# Patient Record
Sex: FEMALE | Race: WHITE | NOT HISPANIC OR LATINO | Employment: OTHER | URBAN - METROPOLITAN AREA
[De-identification: names, ages, dates, MRNs, and addresses within clinical notes are randomized per-mention and may not be internally consistent; named-entity substitution may affect disease eponyms.]

---

## 2017-01-01 ENCOUNTER — TELEPHONE (OUTPATIENT)
Dept: FAMILY MEDICINE CLINIC | Facility: CLINIC | Age: 82
End: 2017-01-01

## 2017-01-01 ENCOUNTER — OFFICE VISIT (OUTPATIENT)
Dept: FAMILY MEDICINE CLINIC | Facility: CLINIC | Age: 82
End: 2017-01-01

## 2017-01-01 VITALS
OXYGEN SATURATION: 100 % | SYSTOLIC BLOOD PRESSURE: 128 MMHG | HEIGHT: 65 IN | DIASTOLIC BLOOD PRESSURE: 72 MMHG | BODY MASS INDEX: 22.49 KG/M2 | WEIGHT: 135 LBS | HEART RATE: 97 BPM | TEMPERATURE: 98 F

## 2017-01-01 DIAGNOSIS — L03.032 PARONYCHIA, TOE, LEFT: Primary | ICD-10-CM

## 2017-01-01 PROCEDURE — 99213 OFFICE O/P EST LOW 20 MIN: CPT | Performed by: NURSE PRACTITIONER

## 2017-01-01 PROCEDURE — 90662 IIV NO PRSV INCREASED AG IM: CPT | Performed by: NURSE PRACTITIONER

## 2017-01-01 PROCEDURE — G0008 ADMIN INFLUENZA VIRUS VAC: HCPCS | Performed by: NURSE PRACTITIONER

## 2017-01-01 RX ORDER — BACITRACIN, NEOMYCIN, POLYMYXIN B 400; 3.5; 5 [USP'U]/G; MG/G; [USP'U]/G
OINTMENT TOPICAL 2 TIMES DAILY
Qty: 15 G | Refills: 0 | Status: SHIPPED | OUTPATIENT
Start: 2017-01-01

## 2017-01-01 RX ORDER — MUPIROCIN CALCIUM 20 MG/G
CREAM TOPICAL 2 TIMES DAILY
Qty: 15 G | Refills: 0 | Status: SHIPPED | OUTPATIENT
Start: 2017-01-01

## 2017-01-01 RX ORDER — CIPROFLOXACIN 500 MG/1
500 TABLET, FILM COATED ORAL 2 TIMES DAILY
Qty: 14 TABLET | Refills: 0 | Status: SHIPPED | OUTPATIENT
Start: 2017-01-01 | End: 2018-01-01 | Stop reason: SDUPTHER

## 2017-01-01 RX ORDER — SPIRONOLACTONE 25 MG/1
12.5 TABLET ORAL
COMMUNITY
Start: 2017-02-13 | End: 2018-01-01

## 2017-01-01 RX ORDER — PROPYLTHIOURACIL 50 MG/1
TABLET ORAL
Qty: 126 TABLET | Refills: 0 | Status: SHIPPED | OUTPATIENT
Start: 2017-01-01 | End: 2017-01-01 | Stop reason: SDUPTHER

## 2017-01-01 RX ORDER — PROPYLTHIOURACIL 50 MG/1
TABLET ORAL
Qty: 126 TABLET | Refills: 0 | Status: SHIPPED | OUTPATIENT
Start: 2017-01-01 | End: 2018-01-01

## 2017-01-01 RX ORDER — DOXYCYCLINE HYCLATE 100 MG/1
100 CAPSULE ORAL 2 TIMES DAILY
Qty: 20 CAPSULE | Refills: 0 | Status: SHIPPED | OUTPATIENT
Start: 2017-01-01 | End: 2017-01-01

## 2017-01-01 RX ORDER — LISINOPRIL 5 MG/1
2.5 TABLET ORAL
COMMUNITY

## 2017-01-17 RX ORDER — PROPYLTHIOURACIL 50 MG/1
TABLET ORAL
Qty: 126 TABLET | Refills: 0 | Status: SHIPPED | OUTPATIENT
Start: 2017-01-17 | End: 2017-02-03 | Stop reason: SDUPTHER

## 2017-02-02 ENCOUNTER — OFFICE VISIT (OUTPATIENT)
Dept: FAMILY MEDICINE CLINIC | Facility: CLINIC | Age: 82
End: 2017-02-02

## 2017-02-02 VITALS
HEART RATE: 88 BPM | WEIGHT: 124 LBS | BODY MASS INDEX: 19.46 KG/M2 | TEMPERATURE: 98.5 F | RESPIRATION RATE: 20 BRPM | OXYGEN SATURATION: 100 % | DIASTOLIC BLOOD PRESSURE: 60 MMHG | HEIGHT: 67 IN | SYSTOLIC BLOOD PRESSURE: 106 MMHG

## 2017-02-02 DIAGNOSIS — Z86.73 HISTORY OF STROKE: ICD-10-CM

## 2017-02-02 DIAGNOSIS — Z86.79 HISTORY OF CONGESTIVE HEART FAILURE: ICD-10-CM

## 2017-02-02 DIAGNOSIS — N39.0 URINARY TRACT INFECTION, SITE UNSPECIFIED: ICD-10-CM

## 2017-02-02 DIAGNOSIS — I25.2 HISTORY OF MYOCARDIAL INFARCTION: ICD-10-CM

## 2017-02-02 DIAGNOSIS — L89.92 PRESSURE ULCER, STAGE II (HCC): ICD-10-CM

## 2017-02-02 DIAGNOSIS — Z86.79 HISTORY OF ATRIAL FIBRILLATION: Primary | ICD-10-CM

## 2017-02-02 DIAGNOSIS — E86.0 DEHYDRATION: ICD-10-CM

## 2017-02-02 DIAGNOSIS — I50.9 CHRONIC CONGESTIVE HEART FAILURE, UNSPECIFIED CONGESTIVE HEART FAILURE TYPE: Primary | ICD-10-CM

## 2017-02-02 LAB
ANION GAP SERPL CALCULATED.3IONS-SCNC: 11.3 MMOL/L
BUN BLD-MCNC: 18 MG/DL (ref 8–23)
BUN/CREAT SERPL: 15.9 (ref 7–25)
CALCIUM SPEC-SCNC: 8.7 MG/DL (ref 8.6–10.5)
CHLORIDE SERPL-SCNC: 103 MMOL/L (ref 98–107)
CO2 SERPL-SCNC: 26.7 MMOL/L (ref 22–29)
CREAT BLD-MCNC: 1.13 MG/DL (ref 0.57–1)
GFR SERPL CREATININE-BSD FRML MDRD: 46 ML/MIN/1.73
GLUCOSE BLD-MCNC: 104 MG/DL (ref 65–99)
POTASSIUM BLD-SCNC: 4.4 MMOL/L (ref 3.5–5.2)
SODIUM BLD-SCNC: 141 MMOL/L (ref 136–145)

## 2017-02-02 PROCEDURE — 80048 BASIC METABOLIC PNL TOTAL CA: CPT | Performed by: FAMILY MEDICINE

## 2017-02-02 PROCEDURE — 99214 OFFICE O/P EST MOD 30 MIN: CPT | Performed by: FAMILY MEDICINE

## 2017-02-02 RX ORDER — SACCHAROMYCES BOULARDII 250 MG
250 CAPSULE ORAL 2 TIMES DAILY
COMMUNITY

## 2017-02-02 RX ORDER — SPIRONOLACTONE 25 MG/1
12.5 TABLET ORAL DAILY
COMMUNITY
Start: 2017-01-21 | End: 2017-02-20

## 2017-02-02 RX ORDER — CEPHALEXIN 500 MG/1
500 CAPSULE ORAL 2 TIMES DAILY
COMMUNITY
End: 2017-01-01

## 2017-02-02 NOTE — PROGRESS NOTES
SUBJECTIVE:  The patient is an 81-year-old white female is here for follow-up.  She was hospitalized from January 18 to January 21.  Her diagnosis was atrial fibrillation congestive heart failure dehydration and coccyx ulcer and hypertension.  He feels much better since her discharge.  She's taking fluids and eating.  Today she has no specific physical complaint.     PAST MEDICAL HISTORY:  Reviewed.    REVIEW OF SYSTEMS:  Please see above; 14 point ROS otherwise negative.      OBJECTIVE: Vitals signs are reviewed and are stable.  Oriented ×3   HEENT: PERRLA.    Neck:  Supple.    Lungs:  Clear.    Heart:  Irregularly irregular   Abdomen:   Soft, nontender.    Extremities:  No cyanosis, clubbing or edema.    Examination of the coccyx area reveals redness but no obvious ulceration currently.  Urinalysis    ASSESSMENT:     CHF  Pressure ulcer coccyx stage II  UTI  Duration  Congestive heart failure-compensated  Hypertension    PLAN:   The patient was unable to urinate she will return with the urine.  MP is ordered.  Call on results.  Notify me if any problems.    Much of this encounter note is an electronic transcription/translation of spoken language to printed text.  The electronic translation of spoken language may permit erroneous, or at times, nonsensical words or phrases to be inadvertently transcribed.  Although I have reviewed the note for such errors, some may still exist.

## 2017-02-03 RX ORDER — PROPYLTHIOURACIL 50 MG/1
TABLET ORAL
Qty: 126 TABLET | Refills: 0 | Status: SHIPPED | OUTPATIENT
Start: 2017-02-03 | End: 2017-01-01 | Stop reason: SDUPTHER

## 2017-02-07 LAB
BACTERIA UR QL AUTO: ABNORMAL /HPF
BILIRUB UR QL STRIP: NEGATIVE
CLARITY UR: CLEAR
COLOR UR: YELLOW
GLUCOSE UR STRIP-MCNC: NEGATIVE MG/DL
HGB UR QL STRIP.AUTO: NEGATIVE
KETONES UR QL STRIP: ABNORMAL
LEUKOCYTE ESTERASE UR QL STRIP.AUTO: NEGATIVE
NITRITE UR QL STRIP: NEGATIVE
PH UR STRIP.AUTO: 5.5 [PH] (ref 4.6–8)
PROT UR QL STRIP: NEGATIVE
RBC # UR: ABNORMAL /HPF
REF LAB TEST METHOD: ABNORMAL
SP GR UR STRIP: 1.02 (ref 1–1.03)
SQUAMOUS #/AREA URNS HPF: ABNORMAL /HPF
UROBILINOGEN UR QL STRIP: ABNORMAL
WBC UR QL AUTO: ABNORMAL /HPF

## 2017-02-07 PROCEDURE — 81001 URINALYSIS AUTO W/SCOPE: CPT | Performed by: FAMILY MEDICINE

## 2017-09-13 NOTE — TELEPHONE ENCOUNTER
Can try neosporin if ointment too expensive. Was the doxycycline pill too expensive too? Has she taken keflex before?

## 2017-09-13 NOTE — TELEPHONE ENCOUNTER
Pt doesn't have prescription insurance and the cream and antibiotic called in today was too expensive. Please advise.    Can you call in one of the free antibiotics to [facility] and its ok to send another ointment to the [facility] too.

## 2017-09-13 NOTE — PROGRESS NOTES
Subjective   Ada Snell is a 82 y.o. female.     History of Present Illness   C/o left great toe pain, started last week, she lost toe nail over the weekend, she states her toe is swollen, infected, red, she denies injury, tripping, she denies being outside, she tried soaking in epsom slt and hydrogen peroxide, has been covering with band aid, her daughter is in the room with her today, she denies fever, she has noticed some drainage, she uses wheelchair most of the time. She is on coumadin, has [facility] that checks and calls cardiology to manage. Denies recent abx. She states home health comes tomorrow, on for afib. Denies any hx of DM.   The following portions of the patient's history were reviewed and updated as appropriate: allergies, current medications, past family history, past medical history, past social history, past surgical history and problem list.    Review of Systems   Constitutional: Negative for chills, diaphoresis and fever.   Respiratory: Negative for shortness of breath.    Cardiovascular: Negative for chest pain.   Musculoskeletal: Positive for gait problem. Negative for arthralgias and myalgias.   Skin: Positive for color change and wound. Negative for rash.   Neurological: Negative for dizziness, light-headedness and headaches.   All other systems reviewed and are negative.      Objective   Physical Exam   Constitutional: She is oriented to person, place, and time. She appears well-developed and well-nourished.   HENT:   Head: Normocephalic.   Eyes: Pupils are equal, round, and reactive to light.   Neck: Neck supple.   Cardiovascular: Normal rate, regular rhythm, normal heart sounds and intact distal pulses.    Pulmonary/Chest: Effort normal and breath sounds normal.   Musculoskeletal: Normal range of motion.       Vascular Status -  Her exam exhibits right foot vasculature abnormal and no right foot edema. Her exam exhibits left foot vasculature abnormal and no left foot edema.    Skin Integrity  -  Her right foot skin is not intact.    Ada 's left foot skin is intact. .     Neurological: She is alert and oriented to person, place, and time.   Skin: Skin is warm and dry. Rash noted. Rash is pustular. Rash is not urticarial. There is erythema.   Psychiatric: She has a normal mood and affect. Her behavior is normal. Judgment and thought content normal.   Nursing note and vitals reviewed.      Assessment/Plan   Ada was seen today for nail problem.    Diagnoses and all orders for this visit:    Paronychia, toe, left  -     Cancel: Ambulatory Referral to Podiatry  -     Ambulatory Referral to Podiatry    Other orders  -     doxycycline (VIBRAMYCIN) 100 MG capsule; Take 1 capsule by mouth 2 (Two) Times a Day for 10 days.  -     mupirocin (BACTROBAN) 2 % cream; Apply  topically 2 (Two) Times a Day.  -     Flu Vaccine High Dose PF 65YR+      Start doxycyline 100mg 2x day for 10 days.  Apply bactroban to left toe base 2x day.   Keep wound clean and dry, may cover with clean band aid loosely.   Refer to podiatry, will call with appt.   Flu vaccine today.   Advised to inform home health and cardiologist about abx and on coumadin, home health visit tomorrow for INR check.  If any worsening symptoms, drainage, fever, call office or go to the ER.   Increase fluid intake, get plenty of rest.   Patient agrees with plan of care and understands instructions. Call if worsening symptoms or any problems or concerns.

## 2017-09-13 NOTE — TELEPHONE ENCOUNTER
Patient's daughter called, manuel, informed cipro called into [facility], informed [facility] nurse and cardiologist she is on abx and to check PT/INR check week, called in bactroban ointment, if too expensive she will try neosporin OTC until she sees podiatry.

## 2017-09-13 NOTE — PATIENT INSTRUCTIONS
Start doxycyline 100mg 2x day for 10 days.  Apply bactroban to left toe base 2x day.   Keep wound clean and dry, may cover with clean band aid loosely.   Refer to podiatry, will call with appt.   Flu vaccine today.   Advised to inform home health and cardiologist about abx and on coumadin, home health visit tomorrow for INR check.  If any worsening symptoms, drainage, fever, call office or go to the ER.   Increase fluid intake, get plenty of rest.   Patient agrees with plan of care and understands instructions. Call if worsening symptoms or any problems or concerns.

## 2017-09-13 NOTE — TELEPHONE ENCOUNTER
Pt's daughter informed and she stated the Doxycycline was expensive too. She hasn't taken Keflex before and as long as it doesn't have Penicillin in it,she should do ok on it.

## 2018-01-01 ENCOUNTER — TELEPHONE (OUTPATIENT)
Dept: FAMILY MEDICINE CLINIC | Facility: CLINIC | Age: 83
End: 2018-01-01

## 2018-01-01 ENCOUNTER — OFFICE VISIT (OUTPATIENT)
Dept: FAMILY MEDICINE CLINIC | Facility: CLINIC | Age: 83
End: 2018-01-01

## 2018-01-01 VITALS
TEMPERATURE: 97.6 F | DIASTOLIC BLOOD PRESSURE: 76 MMHG | SYSTOLIC BLOOD PRESSURE: 112 MMHG | HEIGHT: 66 IN | OXYGEN SATURATION: 96 % | WEIGHT: 135 LBS | HEART RATE: 114 BPM | BODY MASS INDEX: 21.69 KG/M2

## 2018-01-01 DIAGNOSIS — Z00.00 MEDICARE ANNUAL WELLNESS VISIT, INITIAL: Primary | ICD-10-CM

## 2018-01-01 DIAGNOSIS — R60.0 BILATERAL LEG EDEMA: ICD-10-CM

## 2018-01-01 DIAGNOSIS — L03.032 CELLULITIS OF TOE OF LEFT FOOT: ICD-10-CM

## 2018-01-01 DIAGNOSIS — E05.90 HYPERTHYROIDISM: ICD-10-CM

## 2018-01-01 DIAGNOSIS — N30.01 ACUTE CYSTITIS WITH HEMATURIA: ICD-10-CM

## 2018-01-01 DIAGNOSIS — L30.9 DERMATITIS: ICD-10-CM

## 2018-01-01 DIAGNOSIS — I10 ESSENTIAL HYPERTENSION: ICD-10-CM

## 2018-01-01 DIAGNOSIS — I48.21 PERMANENT ATRIAL FIBRILLATION (HCC): ICD-10-CM

## 2018-01-01 LAB
BACTERIA UR QL AUTO: ABNORMAL /HPF
BILIRUB UR QL STRIP: NEGATIVE
CLARITY UR: ABNORMAL
COLOR UR: YELLOW
GLUCOSE UR STRIP-MCNC: NEGATIVE MG/DL
HGB UR QL STRIP.AUTO: ABNORMAL
KETONES UR QL STRIP: NEGATIVE
LEUKOCYTE ESTERASE UR QL STRIP.AUTO: ABNORMAL
NITRITE UR QL STRIP: NEGATIVE
PH UR STRIP.AUTO: 5.5 [PH] (ref 4.6–8)
PROT UR QL STRIP: NEGATIVE
RBC # UR: ABNORMAL /HPF
REF LAB TEST METHOD: ABNORMAL
SP GR UR STRIP: 1.01 (ref 1–1.03)
SQUAMOUS #/AREA URNS HPF: ABNORMAL /HPF
UROBILINOGEN UR QL STRIP: ABNORMAL
WBC UR QL AUTO: ABNORMAL /HPF

## 2018-01-01 PROCEDURE — 96160 PT-FOCUSED HLTH RISK ASSMT: CPT | Performed by: NURSE PRACTITIONER

## 2018-01-01 PROCEDURE — G0438 PPPS, INITIAL VISIT: HCPCS | Performed by: NURSE PRACTITIONER

## 2018-01-01 PROCEDURE — 81001 URINALYSIS AUTO W/SCOPE: CPT | Performed by: NURSE PRACTITIONER

## 2018-01-01 PROCEDURE — 99214 OFFICE O/P EST MOD 30 MIN: CPT | Performed by: NURSE PRACTITIONER

## 2018-01-01 RX ORDER — CIPROFLOXACIN 500 MG/1
500 TABLET, FILM COATED ORAL EVERY 12 HOURS SCHEDULED
Qty: 14 TABLET | Refills: 0 | Status: SHIPPED | OUTPATIENT
Start: 2018-01-01

## 2018-01-01 RX ORDER — CLOTRIMAZOLE AND BETAMETHASONE DIPROPIONATE 10; .64 MG/G; MG/G
CREAM TOPICAL EVERY 12 HOURS SCHEDULED
Qty: 45 G | Refills: 1 | Status: SHIPPED | OUTPATIENT
Start: 2018-01-01

## 2018-01-23 PROBLEM — I47.29 PAROXYSMAL VENTRICULAR TACHYCARDIA (HCC): Status: ACTIVE | Noted: 2018-01-01

## 2018-01-23 PROBLEM — I48.21 PERMANENT ATRIAL FIBRILLATION (HCC): Status: ACTIVE | Noted: 2018-01-01

## 2018-01-23 PROBLEM — Z86.711 HISTORY OF PULMONARY EMBOLISM: Status: ACTIVE | Noted: 2018-01-01

## 2018-01-23 PROBLEM — Z79.01 CHRONIC ANTICOAGULATION: Status: ACTIVE | Noted: 2017-01-20

## 2018-01-23 PROBLEM — I35.1 AORTIC REGURGITATION: Status: ACTIVE | Noted: 2018-01-01

## 2018-01-23 PROBLEM — I51.7 LEFT VENTRICULAR HYPERTROPHY: Status: ACTIVE | Noted: 2018-01-01

## 2018-01-23 PROBLEM — I27.20 PULMONARY HYPERTENSION (HCC): Status: ACTIVE | Noted: 2018-01-01

## 2018-01-23 PROBLEM — E78.5 HYPERLIPIDEMIA LDL GOAL <70: Status: ACTIVE | Noted: 2018-01-01

## 2018-01-23 PROBLEM — N18.9 CHRONIC KIDNEY DISEASE: Status: ACTIVE | Noted: 2018-01-01

## 2018-01-23 PROBLEM — E05.90 HYPERTHYROIDISM: Status: ACTIVE | Noted: 2018-01-01

## 2018-01-23 PROBLEM — Z86.73 HISTORY OF STROKE: Status: ACTIVE | Noted: 2018-01-01

## 2018-01-23 PROBLEM — Z86.718 HISTORY OF DVT OF LOWER EXTREMITY: Status: ACTIVE | Noted: 2018-01-01

## 2018-01-23 PROBLEM — L89.152 PRESSURE ULCER OF COCCYGEAL REGION, STAGE 2 (HCC): Status: ACTIVE | Noted: 2017-01-20

## 2018-01-23 NOTE — PATIENT INSTRUCTIONS
medicare wellness today, UA today shows UTI start cipro 500 BID x 1 wk, Wipe front to back, increase H20 8 glasses day, enc freq toileting, trial lotrisone cream and avoid freq or prolonged exposure to urine and feces to prevent skin breakdown, cont FU with coumadin clinic and cardiology regarding mgmt and persistent B LE edema, suspect d/t untx hyperthyroid, enc elevating over level of heart to help with swelling and toe skin breakdown, apply bactroban ointment TID and monitor  Advance Directive  Advance directives are the legal documents that allow you to make choices about your health care and medical treatment if you cannot speak for yourself. Advance directives are a way for you to communicate your wishes to family, friends, and health care providers. The specified people can then convey your decisions about end-of-life care to avoid confusion if you should become unable to communicate.  Ideally, the process of discussing and writing advance directives should happen over time rather than making decisions all at once. Advance directives can be modified as your situation changes, and you can change your mind at any time, even after you have signed the advance directives. Each state has its own laws regarding advance directives.  You may want to check with your health care provider, , or state representative about the law in your state. Below are some examples of advance directives.  HEALTH CARE PROXY AND DURABLE POWER OF  FOR HEALTH CARE  A health care proxy is a person (agent) appointed to make medical decisions for you if you cannot. Generally, people choose someone they know well and trust to represent their preferences when they can no longer do so. You should be sure to ask this person for agreement to act as your agent. An agent may have to exercise judgment in the event of a medical decision for which your wishes are not known.  A durable power of  for health care is a legal  document that names your health care proxy. Depending on the laws in your state, after the document is written, it may also need to be:  · Signed.  · Notarized.  · Dated.  · Copied.  · Witnessed.  · Incorporated into your medical record.  You may also want to appoint someone to manage your financial affairs if you cannot. This is called a durable power of  for finances. It is a separate legal document from the durable power of  for health care. You may choose the same person or someone different from your health care proxy to act as your agent in financial matters.  LIVING WILL  A living will is a set of instructions documenting your wishes about medical care when you cannot care for yourself. It is used if you become:  · Terminally ill.  · Incapacitated.  · Unable to communicate.  · Unable to make decisions.  Items to consider in your living will include:  · The use or non-use of life-sustaining equipment, such as dialysis machines and breathing machines (ventilators).  · A do not resuscitate (DNR) order, which is the instruction not to use cardiopulmonary resuscitation (CPR) if breathing or heartbeat stops.  · Tube feeding.  · Withholding of food and fluids.  · Comfort (palliative) care when the goal becomes comfort rather than a cure.  · Organ and tissue donation.  A living will does not give instructions about distribution of your money and property if you should pass away. It is advisable to seek the expert advice of a  in drawing up a will regarding your possessions. Decisions about taxes, beneficiaries, and asset distribution will be legally binding. This process can relieve your family and friends of any burdens surrounding disputes or questions that may come up about the allocation of your assets.  DO NOT RESUSCITATE (DNR)  A do not resuscitate (DNR) order is a request to not have CPR in the event that your heart stops beating or you stop breathing. Unless given other instructions, a  health care provider will try to help any patient whose heart has stopped or who has stopped breathing.   This information is not intended to replace advice given to you by your health care provider. Make sure you discuss any questions you have with your health care provider.  Document Released: 2009 Document Revised: 04/10/2017 Document Reviewed: 2014  [employer] Interactive Patient Education © 2017 [employer]    Medicare Wellness  Personal Prevention Plan of Service     Date of Office Visit:  2018  Encounter Provider:  ABIGAIL Peraza  Place of Service:  [facility]  Patient Name: Ada Snell  :  1935    As part of the Medicare Wellness portion of your visit today, we are providing you with this personalized preventive plan of services (PPPS). This plan is based upon recommendations of the [place] Preventive Services Task Force (USPSTF) and the Advisory Committee on Immunization Practices (ACIP).    This lists the preventive care services that should be considered, and provides dates of when you are due. Items listed as completed are up-to-date and do not require any further intervention.    Health Maintenance   Topic Date Due   • TDAP/TD VACCINES (1 - Tdap) 1954   • MEDICARE ANNUAL WELLNESS  03/15/2016   • ZOSTER VACCINE  03/15/2016   • LIPID PANEL  2018   • INFLUENZA VACCINE  Completed   • PNEUMOCOCCAL VACCINES (65+ LOW/MEDIUM RISK)  Addressed       Orders Placed This Encounter   Procedures   • Urinalysis - Urine, Clean Catch   • Urinalysis, Microscopic Only - Urine, Clean Catch   • Urinalysis With Microscopic - Urine, Clean Catch       No Follow-up on file.

## 2018-01-23 NOTE — PROGRESS NOTES
Subjective   Ada Snell is a 82 y.o. female.     History of Present Illness   Here with daughter who c/o some increasing confusion and urin freq more recently last few days, with hx of chronic UTIs as hard for patient to get to bathroom d/t B LE edema, with freq urin and diarrhea, no fevers, no abd pain or flank pain, last tx UTI 09/17 cipro 500 BID and tolerated well, allergic codeine, PCN and sulfa, daughter also states has skin redness and breakdown s/t adult diapers  C/o foot swelling worsening, sees cardiology Krzysztof Johnston and on lasix 40 mg, with hyperthyroid used to take PTU but stopped and doesn't want sgy for goiter, no CP dizziness HA, on coreg, lisinopril, potassium, warfarin, now with B toe skin breakdown and tenderness, has seen wound before but just starting, has bactroban at home, not elevating feet at home    The following portions of the patient's history were reviewed and updated as appropriate: allergies, current medications, past family history, past medical history, past social history, past surgical history and problem list.    Review of Systems   Constitutional: Negative for fever.   Respiratory: Negative for cough, shortness of breath and wheezing.    Cardiovascular: Positive for leg swelling. Negative for chest pain and palpitations.   Gastrointestinal: Positive for diarrhea. Negative for abdominal distention, abdominal pain, anal bleeding, blood in stool, constipation, nausea, rectal pain and vomiting.   Endocrine: Negative for cold intolerance, heat intolerance, polydipsia, polyphagia and polyuria.   Genitourinary: Positive for frequency and urgency. Negative for decreased urine volume, difficulty urinating, dyspareunia, dysuria, enuresis, flank pain, genital sores, hematuria, menstrual problem, pelvic pain, vaginal bleeding, vaginal discharge and vaginal pain.   Musculoskeletal: Positive for arthralgias, back pain, gait problem, joint swelling and myalgias. Negative for neck pain and  neck stiffness.   Neurological: Negative for dizziness and headaches.   Psychiatric/Behavioral: Positive for confusion and dysphoric mood. Negative for agitation, behavioral problems, decreased concentration, hallucinations, self-injury, sleep disturbance and suicidal ideas. The patient is nervous/anxious. The patient is not hyperactive.    All other systems reviewed and are negative.      Objective   Physical Exam   Constitutional: She is oriented to person, place, and time. She appears well-developed and well-nourished.   HENT:   Head: Normocephalic and atraumatic.   Eyes: Conjunctivae and EOM are normal. Pupils are equal, round, and reactive to light.   Cardiovascular: Normal rate, regular rhythm and normal heart sounds.    Pulmonary/Chest: Effort normal and breath sounds normal.   Abdominal: Soft. She exhibits no distension and no mass. There is no tenderness. There is no rebound and no guarding. No hernia.   Musculoskeletal: Normal range of motion. She exhibits edema (B LE pitting) and tenderness (B LE).   Using wheelchair   Neurological: She is alert and oriented to person, place, and time.   Skin: Skin is warm and dry.   Erythematous wounds B toes no dc or bleeding   Psychiatric: She has a normal mood and affect. Her behavior is normal. Judgment and thought content normal.   Vitals reviewed.      Assessment/Plan   Ada was seen today for urinary tract infection and foot swelling.    Diagnoses and all orders for this visit:    Medicare annual wellness visit, initial    Acute cystitis with hematuria  -     Urinalysis With Microscopic - Urine, Clean Catch  -     Urinalysis - Urine, Clean Catch  -     Urinalysis, Microscopic Only - Urine, Clean Catch    Dermatitis    Hyperthyroidism    Permanent atrial fibrillation    Essential hypertension    Bilateral leg edema    Cellulitis of toe of left foot    Other orders  -     clotrimazole-betamethasone (LOTRISONE) 1-0.05 % cream; Apply  topically Every 12 (Twelve)  Hours. aaa BID prn  -     ciprofloxacin (CIPRO) 500 MG tablet; Take 1 tablet by mouth Every 12 (Twelve) Hours.    medicare wellness today, UA today shows UTI start cipro 500 BID x 1 wk, Wipe front to back, increase H20 8 glasses day, enc freq toileting, trial lotrisone cream and avoid freq or prolonged exposure to urine and feces to prevent skin breakdown, cont FU with coumadin clinic and cardiology regarding mgmt and persistent B LE edema, suspect d/t untx hyperthyroid, enc elevating over level of heart to help with swelling and toe skin breakdown, apply bactroban ointment TID and monitor

## 2018-01-23 NOTE — PROGRESS NOTES
QUICK REFERENCE INFORMATION:  The ABCs of the Annual Wellness Visit    Initial Medicare Wellness Visit    HEALTH RISK ASSESSMENT    1935    Recent Hospitalizations:  No hospitalization(s) within the last year.    Current Medical Providers:  Patient Care Team:  Vj Reyes MD as PCP - General (Family Medicine)  ABIGAIL Pacheco as Nurse Practitioner (Family Medicine)    Smoking Status:  History   Smoking Status   • Never Smoker   Smokeless Tobacco   • Not on file     Alcohol Consumption:  History   Alcohol Use No     Depression Screen:   PHQ-2/PHQ-9 Depression Screening 1/23/2018   Little interest or pleasure in doing things 0   Feeling down, depressed, or hopeless 0   Trouble falling or staying asleep, or sleeping too much 0   Feeling tired or having little energy 3   Poor appetite or overeating 0   Feeling bad about yourself - or that you are a failure or have let yourself or your family down 0   Trouble concentrating on things, such as reading the newspaper or watching television 0   Moving or speaking so slowly that other people could have noticed. Or the opposite - being so fidgety or restless that you have been moving around a lot more than usual 0   Thoughts that you would be better off dead, or of hurting yourself in some way 0   Total Score 3   If you checked off any problems, how difficult have these problems made it for you to do your work, take care of things at home, or get along with other people? Not difficult at all       Health Habits and Functional and Cognitive Screening:  Functional & Cognitive Status 1/23/2018   Do you have difficulty preparing food and eating? Yes   Do you have difficulty bathing yourself, getting dressed or grooming yourself? Yes   Do you have difficulty using the toilet? Yes   Do you have difficulty moving around from place to place? Yes   Do you have trouble with steps or getting out of a bed or a chair? Yes   In the past year have you fallen or experienced a  near fall? No   Current Diet Well Balanced Diet   Dental Exam Not up to date   Eye Exam Up to date   Exercise (times per week) 7 times per week   Current Exercise Activities Include Walking   Do you need help using the phone?  No   Are you deaf or do you have serious difficulty hearing?  No   Do you need help with transportation? Yes   Do you need help shopping? Yes   Do you need help preparing meals?  Yes   Do you need help with housework?  Yes   Do you need help with laundry? Yes   Do you need help taking your medications? Yes   Do you need help managing money? Yes   Have you felt unusual stress, anger or loneliness in the last month? No   Who do you live with? Child   If you need help, do you have trouble finding someone available to you? No   Do you have difficulty concentrating, remembering or making decisions? Yes       Does the patient have evidence of cognitive impairment? Yes    Asiprin use counseling: Does not need ASA (and currently is not on it)      Recent Lab Results:reviewd labs 01/18, recheck UA today    Visual Acuity:  No exam data present    Age-appropriate Screening Schedule:  Refer to the list below for future screening recommendations based on patient's age, sex and/or medical conditions. Orders for these recommended tests are listed in the plan section. The patient has been provided with a written plan.    Health Maintenance   Topic Date Due   • TDAP/TD VACCINES (1 - Tdap) 05/29/1954   • ZOSTER VACCINE  03/15/2016   • LIPID PANEL  01/23/2018   • INFLUENZA VACCINE  Completed   • PNEUMOCOCCAL VACCINES (65+ LOW/MEDIUM RISK)  Addressed        Subjective   History of Present Illness    Ada Snell is a 82 y.o. female who presents for an Annual Wellness Visit.    The following portions of the patient's history were reviewed and updated as appropriate: allergies, current medications, past family history, past medical history, past social history, past surgical history and problem  list.      Outpatient Medications Prior to Visit   Medication Sig Dispense Refill   • carvedilol (COREG) 25 MG tablet Take 25 mg by mouth 4 (four) times a day.     • furosemide (LASIX) 40 MG tablet Take 40 mg by mouth. Take 80 mg in AM AND 40 MG QHS     • lisinopril (PRINIVIL,ZESTRIL) 5 MG tablet Take 2.5 mg by mouth.     • mupirocin (BACTROBAN) 2 % cream Apply  topically 2 (Two) Times a Day. 15 g 0   • mupirocin (BACTROBAN) 2 % ointment apply topically to affected area(s) three times daily 22 g 0   • potassium chloride (K-DUR,KLOR-CON) 10 MEQ CR tablet 10 mEq. TAKE 4 TAB PO DAILY     • warfarin (COUMADIN) 5 MG tablet Take 5 mg by mouth As Needed (1/2 TAB ON 7TH DAY  and 2.5MG FOR 6 DAYS).     • neomycin-bacitracin-polymyxin (NEOSPORIN) 400-5-5000 ointment Apply  topically 2 (Two) Times a Day. 15 g 0   • saccharomyces boulardii (FLORASTOR) 250 MG capsule Take 250 mg by mouth 2 (Two) Times a Day.     • ciprofloxacin (CIPRO) 500 MG tablet Take 1 tablet by mouth 2 (Two) Times a Day. 14 tablet 0   • digoxin (LANOXIN) 125 MCG tablet TAKE ONE TABLET BY MOUTH EVERY OTHER DAY 30 tablet 5   • propylthiouracil (PTU) 50 MG tablet TAKE THREE TABLETS BY MOUTH THREE TIMES A DAY FOR 14 DAYS 126 tablet 0   • spironolactone (ALDACTONE) 25 MG tablet Take 12.5 mg by mouth.       No facility-administered medications prior to visit.        Patient Active Problem List   Diagnosis   • Stroke   • Myocardial infarction   • Hypertension   • Aortic regurgitation   • Chronic anticoagulation   • Chronic combined systolic and diastolic CHF (congestive heart failure)   • Chronic kidney disease   • History of pulmonary embolism   • History of stroke   • Hyperlipidemia LDL goal <70   • Hyperthyroidism   • Left ventricular hypertrophy   • Mitral regurgitation   • NICM (nonischemic cardiomyopathy)   • Paroxysmal ventricular tachycardia   • Permanent atrial fibrillation   • Pressure ulcer of coccygeal region, stage 2   • Pulmonary hypertension   •  "Thyrotoxicosis due to multinodular goiter   • Tricuspid regurgitation   • History of DVT of lower extremity       Advance Care Planning:  has NO advance directive - information provided to the patient today    Identification of Risk Factors:  Risk factors include: cardiovascular risk and increased fall risk.    Review of Systems    Compared to one year ago, the patient feels her physical health is better.  Compared to one year ago, the patient feels her mental health is the same.    Objective     Physical Exam    Vitals:    01/23/18 1535   BP: 112/76   BP Location: Right arm   Patient Position: Sitting   Cuff Size: Small Adult   Pulse: 114   Temp: 97.6 °F (36.4 °C)   TempSrc: Oral   SpO2: 96%   Weight: 61.2 kg (135 lb)   Height: 167.6 cm (66\")   PainSc: 0-No pain       Body mass index is 21.79 kg/(m^2).  Discussed the patient's BMI with her. BMI is within normal parameters. No follow-up required.    Assessment/Plan   Patient Self-Management and Personalized Health Advice  The patient has been provided with information about: prevention of cardiac or vascular disease, fall prevention and designing advance directives and preventive services including:   · Advance directive, Fall Risk assessment done.    Visit Diagnoses:    ICD-10-CM ICD-9-CM   1. Medicare annual wellness visit, initial Z00.00 V70.0   2. Acute cystitis with hematuria N30.01 595.0   3. Dermatitis L30.9 692.9   4. Hyperthyroidism E05.90 242.90   5. Permanent atrial fibrillation I48.2 427.31   6. Essential hypertension I10 401.9   7. Bilateral leg edema R60.0 782.3   8. Cellulitis of toe of left foot L03.032 681.10       Orders Placed This Encounter   Procedures   • Urinalysis - Urine, Clean Catch   • Urinalysis, Microscopic Only - Urine, Clean Catch   • Urinalysis With Microscopic - Urine, Clean Catch       Outpatient Encounter Prescriptions as of 1/23/2018   Medication Sig Dispense Refill   • carvedilol (COREG) 25 MG tablet Take 25 mg by mouth 4 (four) " times a day.     • furosemide (LASIX) 40 MG tablet Take 40 mg by mouth. Take 80 mg in AM AND 40 MG QHS     • lisinopril (PRINIVIL,ZESTRIL) 5 MG tablet Take 2.5 mg by mouth.     • mupirocin (BACTROBAN) 2 % cream Apply  topically 2 (Two) Times a Day. 15 g 0   • mupirocin (BACTROBAN) 2 % ointment apply topically to affected area(s) three times daily 22 g 0   • potassium chloride (K-DUR,KLOR-CON) 10 MEQ CR tablet 10 mEq. TAKE 4 TAB PO DAILY     • warfarin (COUMADIN) 5 MG tablet Take 5 mg by mouth As Needed (1/2 TAB ON 7TH DAY  and 2.5MG FOR 6 DAYS).     • ciprofloxacin (CIPRO) 500 MG tablet Take 1 tablet by mouth Every 12 (Twelve) Hours. 14 tablet 0   • clotrimazole-betamethasone (LOTRISONE) 1-0.05 % cream Apply  topically Every 12 (Twelve) Hours. aaa BID prn 45 g 1   • neomycin-bacitracin-polymyxin (NEOSPORIN) 400-5-5000 ointment Apply  topically 2 (Two) Times a Day. 15 g 0   • saccharomyces boulardii (FLORASTOR) 250 MG capsule Take 250 mg by mouth 2 (Two) Times a Day.     • [DISCONTINUED] ciprofloxacin (CIPRO) 500 MG tablet Take 1 tablet by mouth 2 (Two) Times a Day. 14 tablet 0   • [DISCONTINUED] digoxin (LANOXIN) 125 MCG tablet TAKE ONE TABLET BY MOUTH EVERY OTHER DAY 30 tablet 5   • [DISCONTINUED] propylthiouracil (PTU) 50 MG tablet TAKE THREE TABLETS BY MOUTH THREE TIMES A DAY FOR 14 DAYS 126 tablet 0   • [DISCONTINUED] spironolactone (ALDACTONE) 25 MG tablet Take 12.5 mg by mouth.       No facility-administered encounter medications on file as of 1/23/2018.        Reviewed use of high risk medication in the elderly: yes  Reviewed for potential of harmful drug interactions in the elderly: yes    Follow Up:  Return in about 10 days (around 2/2/2018).     An After Visit Summary and PPPS with all of these plans were given to the patient.

## 2018-03-22 ENCOUNTER — TELEPHONE (OUTPATIENT)
Dept: FAMILY MEDICINE CLINIC | Facility: CLINIC | Age: 83
End: 2018-03-22

## 2019-01-24 NOTE — TELEPHONE ENCOUNTER
Forms were faxed this morning  
PATIENTS DAUGHTER DROPPED OFF FMLA PAPERS AND WANTED TO KNOW IF THEY WAS FILLED OUT YET.    PATIENT IS BEING DISCHARGED FROM HOSPITAL AND COMING HOME WITH HOSPARUS PATIENTS DAUGHTER  NEEDS THIS PUT IN FMLA PAPERS PLEASE   
None